# Patient Record
Sex: MALE | Race: WHITE | NOT HISPANIC OR LATINO | Employment: FULL TIME | URBAN - METROPOLITAN AREA
[De-identification: names, ages, dates, MRNs, and addresses within clinical notes are randomized per-mention and may not be internally consistent; named-entity substitution may affect disease eponyms.]

---

## 2023-04-29 ENCOUNTER — HOSPITAL ENCOUNTER (EMERGENCY)
Facility: HOSPITAL | Age: 23
Discharge: HOME | End: 2023-04-29
Attending: EMERGENCY MEDICINE
Payer: COMMERCIAL

## 2023-04-29 VITALS
OXYGEN SATURATION: 97 % | HEIGHT: 74 IN | HEART RATE: 89 BPM | RESPIRATION RATE: 18 BRPM | BODY MASS INDEX: 34.65 KG/M2 | WEIGHT: 270 LBS | TEMPERATURE: 97.5 F | SYSTOLIC BLOOD PRESSURE: 140 MMHG | DIASTOLIC BLOOD PRESSURE: 83 MMHG

## 2023-04-29 DIAGNOSIS — S01.21XA LACERATION OF NOSE, INITIAL ENCOUNTER: Primary | ICD-10-CM

## 2023-04-29 PROCEDURE — 99282 EMERGENCY DEPT VISIT SF MDM: CPT

## 2023-04-29 RX ORDER — GINSENG 100 MG
1 CAPSULE ORAL 2 TIMES DAILY
Qty: 14 G | Refills: 0 | Status: SHIPPED | OUTPATIENT
Start: 2023-04-29

## 2023-04-29 NOTE — ED PROVIDER NOTES
EMERGENCY DEPARTMENT ENCOUNTER    Room Number:  11/11  Date seen:  4/29/2023  PCP: Darren Barillas APRN  Historian: Patient      HPI:  Chief Complaint: Wound  A complete HPI/ROS/PMH/PSH/SH/FH are unobtainable due to: None  Context: Yamil Yoo is a 23 y.o. male who presents to the ED c/o wound.  Patient had laceration to his nose with an angle .  Patient was seen at outside hospital.  Had laceration repaired.  Patient placed on antibiotics and given tetanus shot.  Patient noted that on inner right nare there is a small area that was not closed.  Has had some drainage.  Patient wondering if it should be closed.            PAST MEDICAL HISTORY  Active Ambulatory Problems     Diagnosis Date Noted   • No Active Ambulatory Problems     Resolved Ambulatory Problems     Diagnosis Date Noted   • No Resolved Ambulatory Problems     No Additional Past Medical History         PAST SURGICAL HISTORY  History reviewed. No pertinent surgical history.      FAMILY HISTORY  History reviewed. No pertinent family history.      SOCIAL HISTORY  Social History     Socioeconomic History   • Marital status: Single         ALLERGIES  Patient has no known allergies.        REVIEW OF SYSTEMS  Review of Systems   Laceration      PHYSICAL EXAM  ED Triage Vitals [04/29/23 1151]   Temp Heart Rate Resp BP SpO2   97.5 °F (36.4 °C) 89 18 -- 97 %      Temp src Heart Rate Source Patient Position BP Location FiO2 (%)   Tympanic Monitor -- -- --       Physical Exam      GENERAL: no acute distress  HENT: nares patent. Laceration across nose. Small wound inner lower right nare.    EYES: no scleral icterus  RESPIRATORY: normal effort  ABDOMEN: soft  MUSCULOSKELETAL: no deformity  NEURO: alert, moves all extremities, follows commands  PSYCH:  calm, cooperative  SKIN: warm, dry. Laceration repaired    Vital signs and nursing notes reviewed.          LAB RESULTS  No results found for this or any previous visit (from the past 24  hour(s)).          RADIOLOGY  No Radiology Exams Resulted Within Past 24 Hours                PROCEDURES  Procedures            MEDICATIONS GIVEN IN ER  Medications - No data to display                MEDICAL DECISION MAKING, PROGRESS, and CONSULTS      Discussion below represents my analysis of pertinent findings related to patient's condition, differential diagnosis, treatment plan and final disposition.      Additional sources:  - Discussed/ obtained information from independent historians: None    - External (non-ED) record review: Epic reviewed and no records in epic    - Chronic or social conditions impacting care: None    - Shared decision making: None      Orders placed during this visit:  No orders of the defined types were placed in this encounter.        Additional orders considered but not ordered:  None        Differential diagnosis includes but is not limited to:    Laceration versus abrasion      Independent interpretation of labs, radiology studies, and discussions with consultants:  ED Course as of 04/29/23 1310   Sat Apr 29, 2023   1219 12:19 EDT  Patient's nasal laceration was repaired at outside hospital.  Does have a small little area that was not closed.  We are about 18 hours after the injury.  This is not in a cosmetically important area.  Will not close it.  Do not want this to get infected as the primary closure does look good.  We will provide some bacitracin and he has an appointment to follow-up with a plastic surgeon. [SL]      ED Course User Index  [SL] Gerald Chung MD                 DIAGNOSIS  Final diagnoses:   Laceration of nose, initial encounter         DISPOSITION  DISCHARGE    Patient discharged in stable condition.    Reviewed implications of results, diagnosis, meds, responsibility to follow up, warning signs and symptoms of possible worsening, potential complications and reasons to return to ER, including worsening symptoms    Patient/Family voiced understanding of  above instructions.    Discussed plan for discharge, as there is no emergent indication for admission. Patient referred to primary care provider for BP management due to today's BP. Pt/family is agreeable and understands need for follow up and repeat testing.  Pt is aware that discharge does not mean that nothing is wrong but it indicates no emergency is present that requires admission and they must continue care with follow-up as given below or physician of their choice.     FOLLOW-UP  PATIENT CONNECTION - [place] [id number]  [phone]  Schedule an appointment as soon as possible for a visit            Medication List      New Prescriptions    bacitracin 500 UNIT/GM ointment  Apply 1 application topically to the appropriate area as directed 2 (Two) Times a Day.           Where to Get Your Medications      You can get these medications from any pharmacy    Bring a paper prescription for each of these medications  · bacitracin 500 UNIT/GM ointment                 Latest Documented Vital Signs:  As of 13:10 EDT  BP- 140/83 HR- 89 Temp- 97.5 °F (36.4 °C) (Tympanic) O2 sat- 97%              --    Please note that portions of this were completed with a voice recognition program.       Note Disclaimer: At [facility], we believe that sharing information builds trust and better relationships. You are receiving this note because you are receiving care at [facility] or recently visited. It is possible you will see health information before a provider has talked with you about it. This kind of information can be easy to misunderstand. To help you fully understand what it means for your health, we urge you to discuss this note with your provider.           Gerald Chung MD  04/29/23 8124